# Patient Record
Sex: FEMALE | Race: WHITE | Employment: FULL TIME | URBAN - METROPOLITAN AREA
[De-identification: names, ages, dates, MRNs, and addresses within clinical notes are randomized per-mention and may not be internally consistent; named-entity substitution may affect disease eponyms.]

---

## 2017-07-31 ENCOUNTER — HOSPITAL ENCOUNTER (EMERGENCY)
Facility: CLINIC | Age: 22
Discharge: HOME | End: 2017-07-31
Attending: NURSE PRACTITIONER | Admitting: NURSE PRACTITIONER
Payer: MEDICAID

## 2017-07-31 VITALS
SYSTOLIC BLOOD PRESSURE: 149 MMHG | DIASTOLIC BLOOD PRESSURE: 85 MMHG | OXYGEN SATURATION: 99 % | TEMPERATURE: 98.1 F | HEART RATE: 72 BPM | RESPIRATION RATE: 16 BRPM

## 2017-07-31 DIAGNOSIS — R42 DIZZINESS: ICD-10-CM

## 2017-07-31 DIAGNOSIS — R09.81 NASAL CONGESTION: ICD-10-CM

## 2017-07-31 LAB
ALBUMIN SERPL-MCNC: 3.3 G/DL (ref 3.4–5)
ALP SERPL-CCNC: 65 U/L (ref 40–150)
ALT SERPL W P-5'-P-CCNC: 14 U/L (ref 0–50)
ANION GAP SERPL CALCULATED.3IONS-SCNC: 7 MMOL/L (ref 3–14)
AST SERPL W P-5'-P-CCNC: 10 U/L (ref 0–45)
BILIRUB SERPL-MCNC: 0.2 MG/DL (ref 0.2–1.3)
BUN SERPL-MCNC: 7 MG/DL (ref 7–30)
CALCIUM SERPL-MCNC: 9.1 MG/DL (ref 8.5–10.1)
CHLORIDE SERPL-SCNC: 105 MMOL/L (ref 94–109)
CO2 SERPL-SCNC: 25 MMOL/L (ref 20–32)
CREAT SERPL-MCNC: 0.6 MG/DL (ref 0.52–1.04)
ERYTHROCYTE [DISTWIDTH] IN BLOOD BY AUTOMATED COUNT: 14.6 % (ref 10–15)
GFR SERPL CREATININE-BSD FRML MDRD: ABNORMAL ML/MIN/1.7M2
GLUCOSE SERPL-MCNC: 67 MG/DL (ref 70–99)
HCT VFR BLD AUTO: 37.5 % (ref 35–47)
HGB BLD-MCNC: 12.9 G/DL (ref 11.7–15.7)
MCH RBC QN AUTO: 31.1 PG (ref 26.5–33)
MCHC RBC AUTO-ENTMCNC: 34.4 G/DL (ref 31.5–36.5)
MCV RBC AUTO: 90 FL (ref 78–100)
PLATELET # BLD AUTO: 327 10E9/L (ref 150–450)
POTASSIUM SERPL-SCNC: 4 MMOL/L (ref 3.4–5.3)
PROT SERPL-MCNC: 7.7 G/DL (ref 6.8–8.8)
RBC # BLD AUTO: 4.15 10E12/L (ref 3.8–5.2)
SODIUM SERPL-SCNC: 137 MMOL/L (ref 133–144)
WBC # BLD AUTO: 10.1 10E9/L (ref 4–11)

## 2017-07-31 PROCEDURE — 85027 COMPLETE CBC AUTOMATED: CPT | Performed by: NURSE PRACTITIONER

## 2017-07-31 PROCEDURE — 99283 EMERGENCY DEPT VISIT LOW MDM: CPT | Mod: 25

## 2017-07-31 PROCEDURE — 80053 COMPREHEN METABOLIC PANEL: CPT | Performed by: NURSE PRACTITIONER

## 2017-07-31 PROCEDURE — 96360 HYDRATION IV INFUSION INIT: CPT

## 2017-07-31 PROCEDURE — 25000132 ZZH RX MED GY IP 250 OP 250 PS 637: Performed by: NURSE PRACTITIONER

## 2017-07-31 PROCEDURE — 25000128 H RX IP 250 OP 636: Performed by: NURSE PRACTITIONER

## 2017-07-31 PROCEDURE — 25000131 ZZH RX MED GY IP 250 OP 636 PS 637: Performed by: NURSE PRACTITIONER

## 2017-07-31 RX ORDER — PRENATAL VIT/IRON FUM/FOLIC AC 27MG-0.8MG
1 TABLET ORAL DAILY
COMMUNITY

## 2017-07-31 RX ORDER — PSEUDOEPHEDRINE HCL 30 MG
30 TABLET ORAL ONCE
Status: COMPLETED | OUTPATIENT
Start: 2017-07-31 | End: 2017-07-31

## 2017-07-31 RX ORDER — MECLIZINE HYDROCHLORIDE 25 MG/1
25 TABLET ORAL ONCE
Status: COMPLETED | OUTPATIENT
Start: 2017-07-31 | End: 2017-07-31

## 2017-07-31 RX ORDER — CETIRIZINE HYDROCHLORIDE 10 MG/1
10 TABLET ORAL DAILY
COMMUNITY

## 2017-07-31 RX ADMIN — MECLIZINE HYDROCHLORIDE 25 MG: 25 TABLET ORAL at 18:35

## 2017-07-31 RX ADMIN — SODIUM CHLORIDE 1000 ML: 9 INJECTION, SOLUTION INTRAVENOUS at 18:43

## 2017-07-31 RX ADMIN — PSEUDOEPHEDRINE HCL 30 MG: 30 TABLET, FILM COATED ORAL at 18:35

## 2017-07-31 ASSESSMENT — ENCOUNTER SYMPTOMS
NAUSEA: 1
CHILLS: 0
VOMITING: 1
FEVER: 0
COUGH: 0
DIZZINESS: 1
HEADACHES: 1

## 2017-07-31 NOTE — ED PROVIDER NOTES
History     Chief Complaint:  Eye problem    HPI   Aki Cheng is a 22 year old female who is 16 weeks pregnant and  presents with concern for eye problem. The patient states that she has been experiencing blurry vision/dizziness that is worsened with head movement intermittently for the past two weeks. Her symptoms appear to worsen in the evening. The symptoms have been intermittent, however she comes in today after they have been present for the past 48 hours.  She does note having nausea and episodes of vomiting, but it not concerned about these as they have not changed and seem to be due to her pregnancy. This is the patient's second pregnancy and she denies having these symptoms with her first pregnancy. The patient denies having any cough or cold symptoms but does endorse having normal seasonal allergy symptoms for which she is taking Zyrtec. She has been taking tylenol as well when a headache is associated with her other symptoms.    Allergies:  No Known Drug Allergies      Medications:    Prenatal multivitamin  Prozac  Zyrtec    Past Medical History:    Anxiety  Depressive disorder    Past Surgical History:    History reviewed. No pertinent past surgical history.     Family History:    The patient denies any relevant family medical history.     Social History:  The patient was accompanied to the ED by her boyfriend.  Smoking Status: No  Smokeless Tobacco: No  Alcohol Use: No   Marital Status:      Review of Systems   Constitutional: Negative for chills and fever.   HENT: Negative for postnasal drip.    Eyes: Positive for visual disturbance.   Respiratory: Negative for cough.    Gastrointestinal: Positive for nausea and vomiting.   Neurological: Positive for dizziness and headaches. Negative for syncope.   All other systems reviewed and are negative.    Physical Exam   Vitals:  Patient Vitals for the past 24 hrs:   BP Temp Temp src Pulse Resp SpO2   07/31/17 1651 111/67 98.1  F (36.7  C) Oral 72 16 99  %        Physical Exam  General:  Alert, No obvious discomfort, well kept  HEENT:  Normal Voice, PERRL, EOM normal, oropharynx is normal, Uvula is midline, Conjunctivae and sclerae are normal, No lymphadenopathy. Normal extra occular eye movement.  Neck: Normal range of motion, No meningismus. There is no midline cervical spine pain/tenderness. No mass is detected  CV:  Regular rate and underlying rhythm, Normal Peripheral pulses. No murmurs, rubs or clicks  Resp: Lungs are clear, No tachypnea, Non-labored breathing, No wheezing, crackles, or rales   GI:  Abdomen is soft, there is no rigidity, No distension, No tympani, No rebound tenderness, Non-surgical without peritoneal features    MS:  No major joint effusions, No asymmetric leg swelling. No calf tenderness  Skin:  No rash or acute skin lesions noted, Warm, Dry  Neuro: Alert and oriented to person/place and time.  Cranial nerves II through XII grossly intact, Normal strength and sensation, follows commands, responds appropriately. No focal neurologic defect.   Psych: Awake. Alert.  Normal affect.  Appropriate interactions. Good eye contact     Emergency Department Course     Laboratory:  Laboratory findings were communicated with the patient who voiced understanding of the findings.  CBC: AWNL (WBC 10.1, HGB 12.9, )   CMP: Glucose: 67(L), Albumin: 3.3(L), o/w WNL (Creatinine 0.60)     Interventions:  1835 Antivert 25 mg oral  1835 Sudafed 30 mg oral      Emergency Department Course:  Nursing notes and vitals reviewed.  I performed an exam of the patient as documented above.   IV was inserted and blood was drawn for laboratory testing, results above.     1959 I rechecked with the patient and all of her symptoms were resolved    I discussed the treatment plan with the patient. They expressed understanding of this plan and consented to discharge. They will be discharged home with instructions for care and follow up. In addition, the patient will return to  the emergency department if their symptoms persist, worsen, if new symptoms arise or if there is any concern.  All questions were answered.     I personally reviewed the laboratory results with the Patient and answered all related questions prior to discharge.    Impression & Plan      Medical Decision Making:  Aki Cheng is a 22 year old female who presents to the emergency department today for evaluation of feeling that her vision is a little bit off and intermittent light headedness. These symptoms have been ongoing and intermittent for the last couple of weeks. She was advised to come in for an evaluation and therefore presented today for evaluation. Her symptoms were exacerbated with axially rotation of her head and seem to bother her most when she is lying down at night. She is sixteen weeks pregnant. She has a normal blood pressure. She has no fever and no focal neurologic deficits. She is concerned she may need new glasses as she has not had an eye exam in a couple of years and feels that she is not seeing as well with her current prescription. She also has seasonal allergies causing nasal congestion. She was given the above medication with complete resolution of her symptoms. She had no neurologic defects. There was indication for TIA or CVA. No evidence of preeclampsia. She appears to be safe and appropriate for out patient management,. We did discuss the use of decongestants and or Meclizine to help with her allergy symptoms. No neuro imaging was indicated at this time.  She did have a lightly low glucose today.  We discussed healthy eating.  She will follow up with her primary care provider in 2-3 days if no worsening and come back immediately if any worsening. She is advised to get her eyes examined as soon as possible. She will return to the ER if she develops numbness, weakness, loss of bowel or bladder control, or any other concerns.     Diagnosis:    ICD-10-CM    1. Dizziness R42    2. Nasal  congestion R09.81         Disposition:   Discharged    Scribe Disclosure:  I, Omar TellezLos, am serving as a scribe at 6:13 PM on 7/31/2017 to document services personally performed by Dmitri Still APRN, based on my observations and the provider's statements to me.   [facility] EMERGENCY DEPARTMENT       Dmitri Still APRN CNP  07/31/17 2047

## 2017-07-31 NOTE — ED AVS SNAPSHOT
[facility] Emergency Department    [address]    [facility] [address]    Phone:  [phone]    Fax:  [phone]                                       Aki Cheng   MRN: [id number]    Department:  [facility] Emergency Department   Date of Visit:  7/31/2017           Patient Information     Date Of Birth          1995        Your diagnoses for this visit were:     Dizziness     Nasal congestion        You were seen by Dmitri Still APRN CNP.      Follow-up Information     Follow up with Yelena Doty MD In 2 days.    Why:  if continuned symptoms or sooner if worsening    Contact information:    [facility]  [address] [place] [id number] [phone]          Follow up with [facility] Emergency Department.    Specialty:  EMERGENCY MEDICINE    Why:  If symptoms worsen    Contact information:    [address] [facility] [id number] [phone]        Follow up with your eye doctor for eye examination.        Discharge Instructions         Dizziness (Vertigo) and Balance Problems: Staying Safe     Replace burned-out lightbulbs to keep your home safe and well lit.   Falls or accidents can lead to pain, broken bones, and fear of future falls. Protect yourself and others by preparing for episodes. Simple steps can help you stay safe at home and wherever you go.  Lighting  Keep all areas well lit. This helps your eyes send the right signals to the brain. It also makes you less likely to trip and fall. If bright lights make symptoms worse, dim the lights or lie in a dark room until the dizziness passes. Then turn the lights back to their normal level.  Tips:    Keep a flashlight by the bed.    Place nightlights in bathrooms and hallways.    Replace burned-out bulbs, or have someone replace them for you.  Preventing falls  To reduce your risk of falling:    Get out of bed or up from a chair slowly.    Wear low-heeled  shoes that fit properly and have slip-resistant soles.    Remove throw rugs. Clear clutter from walkways.    Use handrails on stairs. Have handrails installed or adjusted if needed.    Install grab bars in the bathroom. Don't use towel racks for balance.    Use a shower stool. Also put adhesive strips in the shower or on the tub floor.  Going out  With a little time and preparation, you can get around safely.  Tips:    Bring a cane or walking aid if needed.    Give yourself plenty of time in case you start to get dizzy.    Ask your healthcare provider what type of exercise is safe for your condition.    Be patient. If an activity such as walking through a crowded shop causes you stress, you may not be ready for it yet.  Driving  If you become dizzy or disoriented while driving, you could hurt yourself and others. That's why it's best to not drive until symptoms have gone away. In some cases, your license may be temporarily held until it's safe for you to drive again.  For safety:    Ask a friend to drive for you.    Take public transportation.    Walk to stores and other places when you can.  Asking for help  Don't be afraid to ask for help running errands, cooking meals, and doing exercise. Whether it's a friend, loved one, neighbor, or stranger on the street, a little help can make a world of difference.   Date Last Reviewed: 11/1/2016 2000-2017 [employer]. [address] [place] [address], [place] [address]. All rights reserved. This information is not intended as a substitute for professional medical care. Always follow your healthcare professional's instructions.          24 Hour Appointment Hotline       To make an appointment at any [facility] clinic, call [phone] ([phone]). If you don't have a family doctor or clinic, we will help you find one. [facility] clinics are conveniently located to serve the needs of you and your family.             Review of your medicines      Our records show  that you are taking the medicines listed below. If these are incorrect, please call your family doctor or clinic.        Dose / Directions Last dose taken    cetirizine 10 MG tablet   Commonly known as:  zyrTEC   Dose:  10 mg        Take 10 mg by mouth daily   Refills:  0        prenatal multivitamin  plus iron 27-0.8 MG Tabs per tablet   Dose:  1 tablet        Take 1 tablet by mouth daily   Refills:  0        PROZAC PO        Refills:  0                Procedures and tests performed during your visit     CBC (platelets, no diff)    Comprehensive metabolic panel    Saline Lock IV      Orders Needing Specimen Collection     None      Pending Results     No orders found from 7/29/2017 to 8/1/2017.            Pending Culture Results     No orders found from 7/29/2017 to 8/1/2017.            Pending Results Instructions     If you had any lab results that were not finalized at the time of your Discharge, you can call the ED Lab Result RN at [phone]. You will be contacted by this team for any positive Lab results or changes in treatment. The nurses are available 7 days a week from 10A to 6:30P.  You can leave a message 24 hours per day and they will return your call.        Test Results From Your Hospital Stay        7/31/2017  7:42 PM      Component Results     Component Value Ref Range & Units Status    WBC 10.1 4.0 - 11.0 10e9/L Final    RBC Count 4.15 3.8 - 5.2 10e12/L Final    Hemoglobin 12.9 11.7 - 15.7 g/dL Final    Hematocrit 37.5 35.0 - 47.0 % Final    MCV 90 78 - 100 fl Final    MCH 31.1 26.5 - 33.0 pg Final    MCHC 34.4 31.5 - 36.5 g/dL Final    RDW 14.6 10.0 - 15.0 % Final    Platelet Count 327 150 - 450 10e9/L Final         7/31/2017  8:02 PM      Component Results     Component Value Ref Range & Units Status    Sodium 137 133 - 144 mmol/L Final    Potassium 4.0 3.4 - 5.3 mmol/L Final    Chloride 105 94 - 109 mmol/L Final    Carbon Dioxide 25 20 - 32 mmol/L Final    Anion Gap 7 3 - 14 mmol/L Final     Glucose 67 (L) 70 - 99 mg/dL Final    Urea Nitrogen 7 7 - 30 mg/dL Final    Creatinine 0.60 0.52 - 1.04 mg/dL Final    GFR Estimate >90  Non  GFR Calc   >60 mL/min/1.7m2 Final    GFR Estimate If Black >90   GFR Calc   >60 mL/min/1.7m2 Final    Calcium 9.1 8.5 - 10.1 mg/dL Final    Bilirubin Total 0.2 0.2 - 1.3 mg/dL Final    Albumin 3.3 (L) 3.4 - 5.0 g/dL Final    Protein Total 7.7 6.8 - 8.8 g/dL Final    Alkaline Phosphatase 65 40 - 150 U/L Final    ALT 14 0 - 50 U/L Final    AST 10 0 - 45 U/L Final                Clinical Quality Measure: Blood Pressure Screening     Your blood pressure was checked while you were in the emergency department today. The last reading we obtained was  BP: 111/67 . Please read the guidelines below about what these numbers mean and what you should do about them.  If your systolic blood pressure (the top number) is less than 120 and your diastolic blood pressure (the bottom number) is less than 80, then your blood pressure is normal. There is nothing more that you need to do about it.  If your systolic blood pressure (the top number) is 120-139 or your diastolic blood pressure (the bottom number) is 80-89, your blood pressure may be higher than it should be. You should have your blood pressure rechecked within a year by a primary care provider.  If your systolic blood pressure (the top number) is 140 or greater or your diastolic blood pressure (the bottom number) is 90 or greater, you may have high blood pressure. High blood pressure is treatable, but if left untreated over time it can put you at risk for heart attack, stroke, or kidney failure. You should have your blood pressure rechecked by a primary care provider within the next 4 weeks.  If your provider in the emergency department today gave you specific instructions to follow-up with your doctor or provider even sooner than that, you should follow that instruction and not wait for up to 4 weeks for  "your follow-up visit.        Thank you for choosing [facility]       Thank you for choosing [facility] for your care. Our goal is always to provide you with excellent care. Hearing back from our patients is one way we can continue to improve our services. Please take a few minutes to complete the written survey that you may receive in the mail after you visit with us. Thank you!        [employer] Information     [employer] lets you send messages to your doctor, view your test results, renew your prescriptions, schedule appointments and more. To sign up, go to [email or web address] . Click on \"Log in\" on the left side of the screen, which will take you to the Welcome page. Then click on \"Sign up Now\" on the right side of the page.     You will be asked to enter the access code listed below, as well as some personal information. Please follow the directions to create your username and password.     Your access code is: [email or web address]  Expires: 10/29/2017  8:10 PM     Your access code will  in 90 days. If you need help or a new code, please call your [facility] clinic or [phone].        Care EveryWhere ID     This is your Care EveryWhere ID. This could be used by other organizations to access your [facility] medical records  [id number]        Equal Access to Services     MICHEL ROWE : Juliette Zarate, waaxda luqadaha, qaybta kaalmada adeegyada, leonides peterson. So [facility] [phone].    ATENCIÓN: Si habla español, tiene a askew disposición servicios gratuitos de asistencia lingüística. Llame al [phone].    We comply with applicable federal civil rights laws and [place] laws. We do not discriminate on the basis of race, color, national origin, age, disability sex, sexual orientation or gender identity.            After Visit Summary       This is your record. Keep this with you and show to your community pharmacist(s) and doctor(s) at your next visit.                  "

## 2017-07-31 NOTE — LETTER
[facility] EMERGENCY DEPARTMENT  [address] [place] [address]  [phone]    Aki Cheng  [address] [facility] [address]  [phone] (home)     : 1995      To Whom it may concern:    Aki Cheng was seen in our Emergency Department today, 2017.  I expect her condition to improve over the next 1-2 days.  She may return to work/school when improved.    Sincerely,        Franny Ling

## 2017-07-31 NOTE — ED NOTES
"Pt presents to ED c/o \"vision issues.\" Symptoms ongoing for the last 2 weeks. Describes sensation as, \"when I turn my head, everything is in slow motion, my eyes aren't moving as fast.\" Is 16 weeks pregnant. Last vision check was a few years ago. Only wears glasses while driving and at night. Currently denies dizziness. Pt is A&O x4, ABCs intact.   "

## 2017-07-31 NOTE — ED AVS SNAPSHOT
[facility] Emergency Department    [address]    [facility] [address]    Phone:  [phone]    Fax:  [phone]                                       Aki Cheng   MRN: [id number]    Department:  [facility] Emergency Department   Date of Visit:  7/31/2017           After Visit Summary Signature Page     I have received my discharge instructions, and my questions have been answered. I have discussed any challenges I see with this plan with the nurse or doctor.    ..........................................................................................................................................  Patient/Patient Representative Signature      ..........................................................................................................................................  Patient Representative Print Name and Relationship to Patient    ..................................................               ................................................  Date                                            Time    ..........................................................................................................................................  Reviewed by Signature/Title    ...................................................              ..............................................  Date                                                            Time

## 2017-08-01 NOTE — DISCHARGE INSTRUCTIONS
Dizziness (Vertigo) and Balance Problems: Staying Safe     Replace burned-out lightbulbs to keep your home safe and well lit.   Falls or accidents can lead to pain, broken bones, and fear of future falls. Protect yourself and others by preparing for episodes. Simple steps can help you stay safe at home and wherever you go.  Lighting  Keep all areas well lit. This helps your eyes send the right signals to the brain. It also makes you less likely to trip and fall. If bright lights make symptoms worse, dim the lights or lie in a dark room until the dizziness passes. Then turn the lights back to their normal level.  Tips:    Keep a flashlight by the bed.    Place nightlights in bathrooms and hallways.    Replace burned-out bulbs, or have someone replace them for you.  Preventing falls  To reduce your risk of falling:    Get out of bed or up from a chair slowly.    Wear low-heeled shoes that fit properly and have slip-resistant soles.    Remove throw rugs. Clear clutter from walkways.    Use handrails on stairs. Have handrails installed or adjusted if needed.    Install grab bars in the bathroom. Don't use towel racks for balance.    Use a shower stool. Also put adhesive strips in the shower or on the tub floor.  Going out  With a little time and preparation, you can get around safely.  Tips:    Bring a cane or walking aid if needed.    Give yourself plenty of time in case you start to get dizzy.    Ask your healthcare provider what type of exercise is safe for your condition.    Be patient. If an activity such as walking through a crowded shop causes you stress, you may not be ready for it yet.  Driving  If you become dizzy or disoriented while driving, you could hurt yourself and others. That's why it's best to not drive until symptoms have gone away. In some cases, your license may be temporarily held until it's safe for you to drive again.  For safety:    Ask a friend to drive for you.    Take public  transportation.    Walk to stores and other places when you can.  Asking for help  Don't be afraid to ask for help running errands, cooking meals, and doing exercise. Whether it's a friend, loved one, neighbor, or stranger on the street, a little help can make a world of difference.   Date Last Reviewed: 11/1/2016 2000-2017 [employer]. [address] [place] [address], [place] [id number]. All rights reserved. This information is not intended as a substitute for professional medical care. Always follow your healthcare professional's instructions.

## 2018-04-21 ENCOUNTER — HOSPITAL ENCOUNTER (EMERGENCY)
Facility: CLINIC | Age: 23
Discharge: HOME | End: 2018-04-22
Attending: EMERGENCY MEDICINE | Admitting: EMERGENCY MEDICINE
Payer: COMMERCIAL

## 2018-04-21 VITALS
BODY MASS INDEX: 27.46 KG/M2 | OXYGEN SATURATION: 99 % | TEMPERATURE: 98.5 F | SYSTOLIC BLOOD PRESSURE: 134 MMHG | RESPIRATION RATE: 18 BRPM | DIASTOLIC BLOOD PRESSURE: 89 MMHG | HEART RATE: 76 BPM | WEIGHT: 155 LBS | HEIGHT: 63 IN

## 2018-04-21 DIAGNOSIS — R76.11 NONSPECIFIC REACTION TO TUBERCULIN SKIN TEST: ICD-10-CM

## 2018-04-21 PROCEDURE — 99282 EMERGENCY DEPT VISIT SF MDM: CPT

## 2018-04-21 NOTE — ED AVS SNAPSHOT
[facility] Emergency Department    [address] [place] [address]    Phone:  [phone]    Fax:  [phone]                                       Aki Cheng   MRN: [id number]    Department:  [facility] Emergency Department   Date of Visit:  4/21/2018           Patient Information     Date Of Birth          1995        Your diagnoses for this visit were:     Nonspecific reaction to tuberculin skin test        You were seen by Juan Antonio Majano MD.      Follow-up Information     Follow up with Your Primary health provider through your employer for further interpretation of test result.      24 Hour Appointment Hotline       To make an appointment at any [facility] clinic, call [phone] ([phone]). If you don't have a family doctor or clinic, we will help you find one. [facility] clinics are conveniently located to serve the needs of you and your family.             Review of your medicines      Our records show that you are taking the medicines listed below. If these are incorrect, please call your family doctor or clinic.        Dose / Directions Last dose taken    cetirizine 10 MG tablet   Commonly known as:  zyrTEC   Dose:  10 mg        Take 10 mg by mouth daily   Refills:  0        prenatal multivitamin plus iron 27-0.8 MG Tabs per tablet   Dose:  1 tablet        Take 1 tablet by mouth daily   Refills:  0        PROZAC PO        Refills:  0                Orders Needing Specimen Collection     None      Pending Results     No orders found for last 3 day(s).            Pending Culture Results     No orders found for last 3 day(s).            Pending Results Instructions     If you had any lab results that were not finalized at the time of your Discharge, you can call the ED Lab Result RN at [phone]. You will be contacted by this team for any positive Lab results or changes in treatment. The nurses are available 7 days a week from 10A to 6:30P.   You can leave a message 24 hours per day and they will return your call.        Test Results From Your Hospital Stay               Clinical Quality Measure: Blood Pressure Screening     Your blood pressure was checked while you were in the emergency department today. The last reading we obtained was  BP: 134/89 . Please read the guidelines below about what these numbers mean and what you should do about them.  If your systolic blood pressure (the top number) is less than 120 and your diastolic blood pressure (the bottom number) is less than 80, then your blood pressure is normal. There is nothing more that you need to do about it.  If your systolic blood pressure (the top number) is 120-139 or your diastolic blood pressure (the bottom number) is 80-89, your blood pressure may be higher than it should be. You should have your blood pressure rechecked within a year by a primary care provider.  If your systolic blood pressure (the top number) is 140 or greater or your diastolic blood pressure (the bottom number) is 90 or greater, you may have high blood pressure. High blood pressure is treatable, but if left untreated over time it can put you at risk for heart attack, stroke, or kidney failure. You should have your blood pressure rechecked by a primary care provider within the next 4 weeks.  If your provider in the emergency department today gave you specific instructions to follow-up with your doctor or provider even sooner than that, you should follow that instruction and not wait for up to 4 weeks for your follow-up visit.        Thank you for choosing [facility]       Thank you for choosing [facility] for your care. Our goal is always to provide you with excellent care. Hearing back from our patients is one way we can continue to improve our services. Please take a few minutes to complete the written survey that you may receive in the mail after you visit with us. Thank you!        [employer] Information     [employer] lets  "you send messages to your doctor, view your test results, renew your prescriptions, schedule appointments and more. To sign up, go to [email or web address] . Click on \"Log in\" on the left side of the screen, which will take you to the Welcome page. Then click on \"Sign up Now\" on the right side of the page.     You will be asked to enter the access code listed below, as well as some personal information. Please follow the directions to create your username and password.     Your access code is: [email or web address]  Expires: 2018 12:54 AM     Your access code will  in 90 days. If you need help or a new code, please call your [facility] clinic or [phone].        Care EveryWhere ID     This is your Care EveryWhere ID. This could be used by other organizations to access your [facility] medical records  [id number]        Equal Access to Services     MICHEL ROWE : Juliette Zarate, wameek bowen, qalacy kaalmagabby quijano, leonides akins . So [facility] [phone].    ATENCIÓN: Si habla español, tiene a askew disposición servicios gratuitos de asistencia lingüística. Llame al [phone].    We comply with applicable federal civil rights laws and [place] laws. We do not discriminate on the basis of race, color, national origin, age, disability, sex, sexual orientation, or gender identity.            After Visit Summary       This is your record. Keep this with you and show to your community pharmacist(s) and doctor(s) at your next visit.                  "

## 2018-04-21 NOTE — ED AVS SNAPSHOT
[facility] Emergency Department    [address]    [facility] [address]    Phone:  [phone]    Fax:  [phone]                                       Aki Cheng   MRN: [id number]    Department:  [facility] Emergency Department   Date of Visit:  4/21/2018           After Visit Summary Signature Page     I have received my discharge instructions, and my questions have been answered. I have discussed any challenges I see with this plan with the nurse or doctor.    ..........................................................................................................................................  Patient/Patient Representative Signature      ..........................................................................................................................................  Patient Representative Print Name and Relationship to Patient    ..................................................               ................................................  Date                                            Time    ..........................................................................................................................................  Reviewed by Signature/Title    ...................................................              ..............................................  Date                                                            Time

## 2018-04-22 ASSESSMENT — ENCOUNTER SYMPTOMS
FEVER: 0
MYALGIAS: 0

## 2018-04-22 NOTE — ED TRIAGE NOTES
Pt to ER with c/o left fore arm redness and swelling, pt had TB test done on that fore arm now very red and swollen

## 2018-04-22 NOTE — ED PROVIDER NOTES
"  History     Chief Complaint:  Arm Redness & Swelling     HPI   Aki Cheng is a 22 year old female who presents to the ED for evaluation of left forearm redness and swelling. The patient reports she had a TB test done 5 days ago. Her arm was normal when she went to bed. The patient notes she woke up tonight due to an upset stomach. She also noticed her forearm had turned red and was swollen. The area is itchy and hot to the touch. The patient denies any fever or arm pain.      Allergies:  No known drug allergies    Medications:    Prozac  Zyrtec     Past Medical History:    Anxiety  Depression     Past Surgical History:    History reviewed. No pertinent surgical history.    Family History:    History reviewed. No pertinent family history.     Social History:  Smoking status: Never smoker   Alcohol use: No   Presents to ED alone    Marital Status:        Review of Systems   Constitutional: Negative for fever.   Musculoskeletal: Negative for myalgias.   Skin: Positive for rash.   All other systems reviewed and are negative.    Physical Exam     Patient Vitals for the past 24 hrs:   BP Temp Temp src Pulse Resp SpO2 Height Weight   04/21/18 2352 134/89 98.5  F (36.9  C) Temporal 76 18 99 % 1.6 m (5' 3\") 70.3 kg (155 lb)     Physical Exam  Nursing note and vitals reviewed.  Constitutional: Cooperative.   Cardiovascular: Normal rate, regular rhythm. 2+ left radial pulse  Pulmonary/Chest: Effort normal.   Musculoskeletal: Normal range of motion of LUE.  No edema.   Neurological: Alert. Strength and sensation in LUE normal.   Skin: Proximal volar left forearm with diffuse blanching erythema with warmth and soft tissue swelling, without abscess. Area ~4x5cm.  No urticaria.   Psychiatric: Normal mood and affect.     Emergency Department Course     Emergency Department Course:  Past medical records, nursing notes, and vitals reviewed.  0010: I performed an exam of the patient and obtained history, as documented " above.    0045: I rechecked the patient. Explained findings to patient.    Findings and plan explained to the Patient. Patient discharged home with instructions regarding supportive care, medications, and reasons to return. The importance of close follow-up was reviewed.     Impression & Plan      Medical Decision Making:  Aki is a 22 year old female who presents 4-5 days after administration of the mantoux test with swelling and redness to her left forearm. This came up all of a sudden this evening. Etiology is unclear. I suspect nonspecific reaction to the tuberculin test. Classic interpretation of this test is to interpret it between 48 and 72 hours. I'm not sure what the interpretation for sudden swelling at 4.5 days would be indicative of. I did review the [facility] website which states that if the test is not read within 72 hours and they have swelling and induration more than 10mm up to 7 days, that is considered a positive test. My suspicion is that this will ultimately be deemed to be a positive test, and she will need follow-up as per protocol. She will follow-up with her regular physician's office through her work to determine this. This does not appear to be cellulitis, abscess, necrotizing fasciitis, thrombotic disease of the arm, or any other etiology at this time. Though if her symptoms are worsening or she spikes a fever, she needs to be reevaluated.     Diagnosis:    ICD-10-CM   1. Nonspecific reaction to tuberculin skin test R76.11     Disposition: Patient discharged to home     Idalia Eason  4/21/2018   [facility] EMERGENCY DEPARTMENT    I, Idalia Eason, am serving as a scribe at 12:10 AM on 4/22/2018 to document services personally performed by Juan Antonio Majano MD based on my observations and the provider's statements to me.        Juan Antonio Majano MD  04/22/18 0324

## 2019-10-21 ENCOUNTER — HOSPITAL ENCOUNTER (EMERGENCY)
Facility: CLINIC | Age: 24
Discharge: HOME | End: 2019-10-21
Attending: EMERGENCY MEDICINE | Admitting: EMERGENCY MEDICINE
Payer: COMMERCIAL

## 2019-10-21 ENCOUNTER — APPOINTMENT (OUTPATIENT)
Dept: GENERAL RADIOLOGY | Facility: CLINIC | Age: 24
End: 2019-10-21
Attending: EMERGENCY MEDICINE
Payer: COMMERCIAL

## 2019-10-21 VITALS
TEMPERATURE: 98.8 F | RESPIRATION RATE: 14 BRPM | SYSTOLIC BLOOD PRESSURE: 112 MMHG | OXYGEN SATURATION: 98 % | WEIGHT: 170 LBS | HEART RATE: 66 BPM | DIASTOLIC BLOOD PRESSURE: 74 MMHG | BODY MASS INDEX: 30.11 KG/M2

## 2019-10-21 DIAGNOSIS — R07.9 CHEST PAIN, UNSPECIFIED TYPE: ICD-10-CM

## 2019-10-21 LAB
ANION GAP SERPL CALCULATED.3IONS-SCNC: 3 MMOL/L (ref 3–14)
BASOPHILS # BLD AUTO: 0 10E9/L (ref 0–0.2)
BASOPHILS NFR BLD AUTO: 0.3 %
BUN SERPL-MCNC: 9 MG/DL (ref 7–30)
CALCIUM SERPL-MCNC: 8.9 MG/DL (ref 8.5–10.1)
CHLORIDE SERPL-SCNC: 106 MMOL/L (ref 94–109)
CO2 SERPL-SCNC: 28 MMOL/L (ref 20–32)
CREAT SERPL-MCNC: 0.68 MG/DL (ref 0.52–1.04)
DIFFERENTIAL METHOD BLD: NORMAL
EOSINOPHIL # BLD AUTO: 0.2 10E9/L (ref 0–0.7)
EOSINOPHIL NFR BLD AUTO: 2.3 %
ERYTHROCYTE [DISTWIDTH] IN BLOOD BY AUTOMATED COUNT: 13.3 % (ref 10–15)
GFR SERPL CREATININE-BSD FRML MDRD: >90 ML/MIN/{1.73_M2}
GLUCOSE SERPL-MCNC: 99 MG/DL (ref 70–99)
HCT VFR BLD AUTO: 39.9 % (ref 35–47)
HGB BLD-MCNC: 13.4 G/DL (ref 11.7–15.7)
IMM GRANULOCYTES # BLD: 0 10E9/L (ref 0–0.4)
IMM GRANULOCYTES NFR BLD: 0.2 %
LYMPHOCYTES # BLD AUTO: 3.5 10E9/L (ref 0.8–5.3)
LYMPHOCYTES NFR BLD AUTO: 34.6 %
MCH RBC QN AUTO: 30.6 PG (ref 26.5–33)
MCHC RBC AUTO-ENTMCNC: 33.6 G/DL (ref 31.5–36.5)
MCV RBC AUTO: 91 FL (ref 78–100)
MONOCYTES # BLD AUTO: 0.5 10E9/L (ref 0–1.3)
MONOCYTES NFR BLD AUTO: 4.5 %
NEUTROPHILS # BLD AUTO: 5.8 10E9/L (ref 1.6–8.3)
NEUTROPHILS NFR BLD AUTO: 58.1 %
NRBC # BLD AUTO: 0 10*3/UL
NRBC BLD AUTO-RTO: 0 /100
PLATELET # BLD AUTO: 350 10E9/L (ref 150–450)
POTASSIUM SERPL-SCNC: 3.5 MMOL/L (ref 3.4–5.3)
RBC # BLD AUTO: 4.38 10E12/L (ref 3.8–5.2)
SODIUM SERPL-SCNC: 137 MMOL/L (ref 133–144)
TROPONIN I SERPL-MCNC: <0.015 UG/L (ref 0–0.04)
WBC # BLD AUTO: 10 10E9/L (ref 4–11)

## 2019-10-21 PROCEDURE — 85025 COMPLETE CBC W/AUTO DIFF WBC: CPT | Performed by: EMERGENCY MEDICINE

## 2019-10-21 PROCEDURE — 80048 BASIC METABOLIC PNL TOTAL CA: CPT | Performed by: EMERGENCY MEDICINE

## 2019-10-21 PROCEDURE — 25000132 ZZH RX MED GY IP 250 OP 250 PS 637: Performed by: EMERGENCY MEDICINE

## 2019-10-21 PROCEDURE — 71046 X-RAY EXAM CHEST 2 VIEWS: CPT

## 2019-10-21 PROCEDURE — 93005 ELECTROCARDIOGRAM TRACING: CPT

## 2019-10-21 PROCEDURE — 84484 ASSAY OF TROPONIN QUANT: CPT | Performed by: EMERGENCY MEDICINE

## 2019-10-21 PROCEDURE — 99285 EMERGENCY DEPT VISIT HI MDM: CPT | Mod: 25

## 2019-10-21 RX ORDER — IBUPROFEN 600 MG/1
600 TABLET, FILM COATED ORAL ONCE
Status: COMPLETED | OUTPATIENT
Start: 2019-10-21 | End: 2019-10-21

## 2019-10-21 RX ORDER — HYDROXYZINE HYDROCHLORIDE 25 MG/1
25 TABLET, FILM COATED ORAL ONCE
Status: COMPLETED | OUTPATIENT
Start: 2019-10-21 | End: 2019-10-21

## 2019-10-21 RX ORDER — ALBUTEROL SULFATE 90 UG/1
2 AEROSOL, METERED RESPIRATORY (INHALATION) EVERY 6 HOURS PRN
Qty: 1 INHALER | Refills: 0 | Status: SHIPPED | OUTPATIENT
Start: 2019-10-21

## 2019-10-21 RX ADMIN — IBUPROFEN 600 MG: 600 TABLET, FILM COATED ORAL at 19:44

## 2019-10-21 RX ADMIN — HYDROXYZINE HYDROCHLORIDE 25 MG: 25 TABLET, FILM COATED ORAL at 19:45

## 2019-10-21 ASSESSMENT — ENCOUNTER SYMPTOMS
CHEST TIGHTNESS: 1
SHORTNESS OF BREATH: 0
COUGH: 0
NUMBNESS: 1

## 2019-10-21 NOTE — ED TRIAGE NOTES
Pt presents with chest pain x3 days.  has had 2 30 minute episodes today with 8/10 tightness, blurry vision, and weak legs. Currently 2/10 right now, radiates through to back. ABCs intact,  has a hx of panic attacks but this is different.

## 2019-10-21 NOTE — ED PROVIDER NOTES
History     Chief Complaint:  Chest Pain    The history is provided by the patient.      Aki Cheng is a 24 year old female with anxiety and exercise-induced asthma who presents with chest pain. The patient has been having central chest tightness and pain that radiates to back. She has also had 2 episodes of leg, shoulder, and neck numbness today that last about 30 minutes. Her last began around 1330. Her son had the stomach flu recently but otherwise she has had no sick contact. She denies recent injury, cough, abnormal shortness of breath, smoking, or family history of lung problems. The patient does have a history of panic attacks but notes her recent symptoms are not comparable. She does have a history of recurrent pneumonia in her childhood, but is not aware of any abscesses or other complications.     Allergies:  Lidocaine    Medications:    Trazodone    Past Medical History:    Anxiety  Depression  Exercise-induced asthma    Past Surgical History:    The patient does not have any pertinent past surgical history.     Family History:    Mother - asthma     Social History:  Reports to Emergency Department with her grandfather  Negative for tobacco use.  Negative for alcohol use.   Marital Status:   [2]     Review of Systems   Respiratory: Positive for chest tightness. Negative for cough and shortness of breath.    Cardiovascular: Positive for chest pain.   Neurological: Positive for numbness.   All other systems reviewed and are negative.    Physical Exam     Patient Vitals for the past 24 hrs:   BP Temp Temp src Pulse Heart Rate Resp SpO2 Weight   10/21/19 1946 -- -- -- -- 78 21 99 % --   10/21/19 1945 134/81 -- -- -- 85 -- 98 % --   10/21/19 1915 -- -- -- -- 96 -- 98 % --   10/21/19 1900 119/78 -- -- 85 89 22 98 % --   10/21/19 1845 121/77 -- -- -- 72 -- 98 % --   10/21/19 1841 -- -- -- -- -- -- -- 77.1 kg (170 lb)   10/21/19 1830 126/75 -- -- -- -- -- -- --   10/21/19 1804 131/89 98.8  F (37.1   C) Oral -- 87 18 98 % --        Physical Exam   Constitutional: Vital signs reviewed as above.   Head: No external signs of trauma noted.  Eyes: Pupils are equal, round, and reactive to light.   Neck: No JVD noted  Cardiovascular: Normal rate, regular rhythm and normal heart sounds.  No murmur heard. Equal B/L peripheral pulses.  Pulmonary/Chest: Effort normal and breath sounds normal. No respiratory distress. Patient has no wheezes. Patient has no rales.   Gastrointestinal: Soft. There is no tenderness.   Musculoskeletal/Extremities: No edema noted. Normal tone.  Neurological: Patient is alert and oriented to person, place, and time.   Skin: Skin is warm and dry. There is no diaphoresis noted.   Psychiatric: The patient appears calm.    Emergency Department Course     ECG:  Indication: chest pain   Time: 1800  Vent. Rate 74 bpm. ME interval 150. QRS duration 90. QT/QTc 382/415. P-R-T axis 7 47 36.  Normal sinus rhythm with sinus arrhythmia. Normal ECG. No prior ECG for comparison. Read time: 1849    Imaging:  Radiology findings were communicated with the patient and family who voiced understanding of the findings.    XR chest 2 views:  Negative chest, as per radiology.     Laboratory:  Laboratory findings were communicated with the patient and family who voiced understanding of the findings.    CBC: WBC 10.0, HGB 13.4,    BMP: WNL (Creatinine 0.68)    1840 Troponin: <0.015     Interventions:  1944 Advil 600 mg Po  1945 Atarax 25 mg PO     Emergency Department Course:  Past medical records, nursing notes, and vitals reviewed.    1754: I performed an exam of the patient as documented above.     EKG obtained in the ED, see results above.   IV was inserted and blood was drawn for laboratory testing, results above.  The patient was sent for a chest x-ray while in the emergency department, results above.     2025: Patient rechecked and updated.      I personally reviewed the laboratory, EKG, and imaging results  with the Patient and grandfather and answered all related questions prior to discharge. Albuterol was prescribed.    Impression & Plan     Medical Decision Making:  This 24-year-old female patient presents to the ED due to chest pain.  Please see the HPI and exam for specifics.  The patient's EKG and blood work appear normal and at this time I am not concerned for ACS. She is PERC negative. Other etiologies could include gastritis, costochondritis, pleurisy, or even anxiety.  At this time I believe the patient can be discharged.  I will encouraged outpatient follow-up and return to the ED if anything worsens.  Anticipatory guidance given prior to discharge.      Discharge Diagnosis:    ICD-10-CM    1. Chest pain, unspecified type R07.9        Disposition:  Discharged to home    Discharge Medications:  New Prescriptions    ALBUTEROL (PROAIR HFA/PROVENTIL HFA/VENTOLIN HFA) 108 (90 BASE) MCG/ACT INHALER    Inhale 2 puffs into the lungs every 6 hours as needed for shortness of breath / dyspnea or wheezing       Scribe Disclosure:  I, Margarita Qureshi, am serving as a scribe at 7:45 PM on 10/21/2019 to document services personally performed by Gilmar Venegas DO based on my observations and the provider's statements to me.      Gilmar Venegas DO  10/21/19 3553

## 2019-10-21 NOTE — ED AVS SNAPSHOT
[facility] Emergency Department  [address]  [facility] [address]  Phone:  [phone]  Fax:  [phone]                                    Aki Cheng   MRN: [id number]    Department:  [facility] Emergency Department   Date of Visit:  10/21/2019           After Visit Summary Signature Page    I have received my discharge instructions, and my questions have been answered. I have discussed any challenges I see with this plan with the nurse or doctor.    ..........................................................................................................................................  Patient/Patient Representative Signature      ..........................................................................................................................................  Patient Representative Print Name and Relationship to Patient    ..................................................               ................................................  Date                                   Time    ..........................................................................................................................................  Reviewed by Signature/Title    ...................................................              ..............................................  Date                                               Time          22EPIC Rev 08/18

## 2019-10-22 LAB — INTERPRETATION ECG - MUSE: NORMAL

## 2019-10-22 NOTE — DISCHARGE INSTRUCTIONS
No serious cause of chest pain was identified tonight.  You may try some of your anxiety medication at home and see if that helps.  As you do not have a current prescription for a rescue inhaler I will prescribe that for you tonight.  Follow-up with your primary clinic in the outpatient setting.  Return to the ED if you feel worse.